# Patient Record
Sex: MALE | Race: BLACK OR AFRICAN AMERICAN | NOT HISPANIC OR LATINO | Employment: FULL TIME | URBAN - METROPOLITAN AREA
[De-identification: names, ages, dates, MRNs, and addresses within clinical notes are randomized per-mention and may not be internally consistent; named-entity substitution may affect disease eponyms.]

---

## 2024-03-20 ENCOUNTER — OFFICE VISIT (OUTPATIENT)
Dept: FAMILY MEDICINE CLINIC | Facility: CLINIC | Age: 29
End: 2024-03-20

## 2024-03-20 VITALS
DIASTOLIC BLOOD PRESSURE: 81 MMHG | BODY MASS INDEX: 21.07 KG/M2 | TEMPERATURE: 98.3 F | WEIGHT: 164.2 LBS | RESPIRATION RATE: 18 BRPM | SYSTOLIC BLOOD PRESSURE: 117 MMHG | HEIGHT: 74 IN | HEART RATE: 64 BPM | OXYGEN SATURATION: 97 %

## 2024-03-20 DIAGNOSIS — Z00.00 ANNUAL PHYSICAL EXAM: Primary | ICD-10-CM

## 2024-03-20 DIAGNOSIS — Z11.59 NEED FOR HEPATITIS C SCREENING TEST: ICD-10-CM

## 2024-03-20 DIAGNOSIS — Z11.4 ENCOUNTER FOR SCREENING FOR HIV: ICD-10-CM

## 2024-03-20 DIAGNOSIS — Z87.19 HISTORY OF DENTAL PROBLEMS: ICD-10-CM

## 2024-03-20 DIAGNOSIS — R63.4 WEIGHT LOSS, UNINTENTIONAL: ICD-10-CM

## 2024-03-20 DIAGNOSIS — R59.0 ANTERIOR CERVICAL LYMPHADENOPATHY: ICD-10-CM

## 2024-03-20 PROCEDURE — 99385 PREV VISIT NEW AGE 18-39: CPT | Performed by: FAMILY MEDICINE

## 2024-03-20 NOTE — ASSESSMENT & PLAN NOTE
Patient presents to [facility] care and for annual physical.  His only complaint today is a right-sided neck mass for which he has had for several months, which is now accompanied by 10 kg weight loss.  He states that he is otherwise healthy and does not have a significant past medical history nor does he take any regular medications.  He is a former smoker, and quit about 4 months ago.    Plan:  - Obtain head/neck ultrasound  - Obtain CBC, TSH  - Follow-up shortly after obtaining workup

## 2024-03-20 NOTE — PROGRESS NOTES
ADULT ANNUAL PHYSICAL  [facility]    NAME: James Roberts  AGE: 28 y.o. SEX: male  : 1995     DATE: 3/20/2024     Assessment and Plan:     Problem List Items Addressed This Visit       Anterior cervical lymphadenopathy     Patient complains of a right neck mass since about 2023.  He went to the emergency department back in August when he noticed that it had been slowly getting larger.  Present at that time revealed possibly reactive prominent right submandibular lymph nodes.  The mass has not grown since being seen in the ED.  He reports that it is not painful and denies overlying erythema, warmth, tenderness. He also denied any trauma, fevers, chills, night sweats, neck pain/stiffness, dental pain, personal or family history of thyroid disorder or history of lymphoma, night sweats, headache, eye complaints, ear complaints, URI symptoms, chest pain, SOB, N/V/D/abdominal pain.  He does endorse a 10 kg weight loss.    On exam, the mass is mobile, nontender, firm, about 2 to 3 cm, no overlying erythema/warmth/discoloration of the skin.    DDx: Reactive lymph node, lymphoma, less likely atypical thyroid nodule    Plan:  - Repeat head and neck ultrasound  - Obtain CBC, TSH  -Follow-up after getting blood work and ultrasound         Relevant Orders    US head neck soft tissue    CBC and differential    Annual physical exam - Primary     Patient presents to establish care and for annual physical.  His only complaint today is a right-sided neck mass for which he has had for several months, which is now accompanied by 10 kg weight loss.  He states that he is otherwise healthy and does not have a significant past medical history nor does he take any regular medications.  He is a former smoker, and quit about 4 months ago.    Plan:  - Obtain head/neck ultrasound  - Obtain CBC, TSH  - Follow-up shortly after obtaining workup          Weight loss, unintentional     Patient reports a 10 kg weight loss over the span of 4 months period.  This is unintentional, and patient states that he has been otherwise eating about the same as he normally does.  No history of thyroid disorder, PHQ is negative today.    Plan:  - Obtain TSH         Relevant Orders    CBC and differential    TSH, 3rd generation with Free T4 reflex    Encounter for screening for HIV     Patient with weight loss, reactive lymph node  Also agreeable to one-time screening for HIV    Plan:  - Obtain HIV screening         Relevant Orders    HIV 1/2 AB/AG w Reflex [facility] for 2 yr old and above    Need for hepatitis C screening test     Patient with weight loss, lymphadenopathy  Agreeable to one-time screening test for hepatitis C    Plan:  - Obtain hepatitis C screening test         Relevant Orders    Hepatitis C antibody    History of dental problems     Patient not established with a dentist.  Reports brushing twice a day and occasional coughing.  Last time he went to a dentist was in [place], which was over 2 years ago.    Plan:  - Referred to dental clinic         Relevant Orders    Ambulatory referral to [facility]         Immunizations and preventive care screenings were discussed with patient today. Appropriate education was printed on patient's after visit summary.    Counseling:  Dental Health: discussed importance of regular tooth brushing, flossing, and dental visits.  Sexual health: discussed sexually transmitted diseases, partner selection, use of condoms, avoidance of unintended pregnancy, and contraceptive alternatives.  Exercise: the importance of regular exercise/physical activity was discussed. Recommend exercise 3-5 times per week for at least 30 minutes.          Return in about 5 weeks (around 4/24/2024) for Follow up on ultrasound results and labwork.     Chief Complaint:     Chief Complaint   Patient presents with    Establish Care    Mass     On neck,  "growing bigger. No pain. Present for 6 months       History of Present Illness:     Adult Annual Physical   Patient here for a comprehensive physical exam. The patient reports problems - right-sided neck mass .    Patient is a 28-year-old male without significant past medical history who presents to the office today to establish care, for annual physical, and to discuss a right-sided neck mass.  Patient states that he did not ever need to regularly take any medications, nor is he on any at this time.  He is unaware of any significant family history, though he believes his mother and does have high blood pressure.  Patient recently moved from [place] about 2 years ago, and he lives with his wife and 2 daughters.  Patient had smoked for 12 years and recently quit about 4-6 months ago.  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    With regard to this right-sided neck mass, he was seen in August 2023 for the same.  At the ED, he underwent some blood work which was relatively unremarkable and head/neck ultrasound.  The ultrasound showed \"mildly prominent right submandibular lymph nodes, which could be reactive given maintaining normal shape and morphology, but warrants continued surveillance.\"  He was recommended at that time that he would repeat ultrasound in 4 to 6 weeks, however, due to work and other commitments he has not been able to do so.    The mass has remained about the same size, about 2 to 3 cm, since August.  Though he does admit that it had been increasing in size prior to that.  The mass is nontender, mobile, does not fluctuate in size, no overlying erythema, no overlying warmth.  He does not believe there are any other lumps/bumps on his body, though from the ED he was told that there were other lymph nodes that were larger.     He denies trouble swallowing, pain in throat or mouth, trauma, surgeries, night sweats, recent dental procedures or infections, known family history of " "cancers or thyroid problems.  He does however note that he is lost about 10 kg over the past 4 months despite a relatively normal diet.        Diet and Physical Activity  Diet/Nutrition: well balanced diet.   Exercise: no formal exercise.      Depression Screening  PHQ-2/9 Depression Screening    Little interest or pleasure in doing things: 1 - several days  Feeling down, depressed, or hopeless: 1 - several days  PHQ-2 Score: 2  PHQ-2 Interpretation: Negative depression screen       General Health  Sleep: gets 7-8 hours of sleep on average.   Hearing: normal - bilateral.  Vision: wears glasses.   Dental: no dental visits for >1 year, brushes teeth twice daily, and flosses teeth occasionally.        Health  History of STDs?: no.    28 y.o. male here for annual physical exam.     Immunization:  -Reports UTD    Screening: I have discussed each screening test below (per USPSTF guideline) with patient, to which patient expresses understanding and is agreeable to plan  - Obesity -- Negative (all children >7yo)  - Hypertension -- Negative (all adults)  - Nicotine/EtOH/Drugs --  Smoked tobacco for 12 yrs; quit 4 months ago  (all adults >19yo)  - Depression -- Negative (all adults AND adolescents 12-17YO)  Depression: Not at risk (3/20/2024)    PHQ-2     PHQ-2 Score: 2     - HIV -- Ordered (all adults age 15-65)  - Hep C -- Ordered (all adults age 18-79)  No results found for: \"HIVAGAB\", \"HEPCAB\"  - T2DM -- Deferred by patient. (adults age 35-70 who has overweight/obesity)  No results found for: \"HGBA1C\"  Lab Results   Component Value Date    CREATININE 1.08 08/02/2023         Health Maintenance   Topic Date Due    Hepatitis C Screening  Never done    HIV Screening  Never done    BMI: Adult  Never done    DTaP,Tdap,and Td Vaccines (1 - Tdap) Never done    Influenza Vaccine (1) Never done    COVID-19 Vaccine (1 - 2023-24 season) Never done    Depression Screening  03/20/2025    Annual Physical  03/20/2025    Zoster Vaccine " (1 of 2) 04/14/2044    Pneumococcal Vaccine: Pediatrics (0 to 5 Years) and At-Risk Patients (6 to 64 Years)  Aged Out    HIB Vaccine  Aged Out    IPV Vaccine  Aged Out    Hepatitis A Vaccine  Aged Out    Meningococcal ACWY Vaccine  Aged Out    HPV Vaccine  Aged Out            Review of Systems:     Review of Systems   Constitutional:  Positive for unexpected weight change (10kg weight loss in 4 months). Negative for activity change, appetite change, chills, fatigue and fever.   HENT:  Negative for congestion, dental problem, drooling, ear discharge, ear pain, facial swelling, hearing loss, mouth sores, sore throat, tinnitus, trouble swallowing and voice change.    Eyes:  Negative for pain and visual disturbance.   Respiratory:  Negative for apnea, cough, choking, shortness of breath, wheezing and stridor.    Cardiovascular:  Negative for chest pain and palpitations.   Gastrointestinal:  Negative for abdominal distention, abdominal pain, blood in stool, constipation, diarrhea, nausea and vomiting.   Endocrine: Negative for cold intolerance, heat intolerance, polydipsia, polyphagia and polyuria.   Genitourinary:  Negative for dysuria, flank pain, frequency, hematuria, penile pain, testicular pain and urgency.   Musculoskeletal:  Negative for neck pain and neck stiffness.        Right-sided neck mass   Skin:  Negative for color change and rash.   Neurological:  Negative for dizziness, syncope, facial asymmetry, speech difficulty, weakness, light-headedness and headaches.   Psychiatric/Behavioral:  Negative for dysphoric mood, sleep disturbance and suicidal ideas. The patient is not nervous/anxious.       Past Medical History:     History reviewed. No pertinent past medical history.   Past Surgical History:     History reviewed. No pertinent surgical history.   Social History:     Social History     Socioeconomic History    Marital status: Single     Spouse name: None    Number of children: None    Years of education:  None    Highest education level: None   Occupational History    None   Tobacco Use    Smoking status: Every Day     Types: Cigarettes    Smokeless tobacco: Never   Vaping Use    Vaping status: Never Used   Substance and Sexual Activity    Alcohol use: Yes     Comment: Socially    Drug use: Never    Sexual activity: Yes     Partners: Female   Other Topics Concern    None   Social History Narrative    None     Social Determinants of Health     Financial Resource Strain: Low Risk  (3/20/2024)    Overall Financial Resource Strain (CARDIA)     Difficulty of Paying Living Expenses: Not hard at all   Food Insecurity: No Food Insecurity (3/20/2024)    Hunger Vital Sign     Worried About Running Out of Food in the Last Year: Never true     Ran Out of Food in the Last Year: Never true   Transportation Needs: No Transportation Needs (3/20/2024)    PRAPARE - Transportation     Lack of Transportation (Medical): No     Lack of Transportation (Non-Medical): No   Physical Activity: Inactive (3/20/2024)    Exercise Vital Sign     Days of Exercise per Week: 0 days     Minutes of Exercise per Session: 0 min   Stress: Stress Concern Present (3/20/2024)    [employer] - Occupational Stress Questionnaire     Feeling of Stress : To some extent   Social Connections: Socially Isolated (3/20/2024)    Social Connection and Isolation Panel [NHANES]     Frequency of Communication with Friends and Family: More than three times a week     Frequency of Social Gatherings with Friends and Family: More than three times a week     Attends [employer] Services: Never     Active Member of Clubs or Organizations: No     Attends Club or Organization Meetings: Never     Marital Status: Never    Intimate Partner Violence: Not At Risk (3/20/2024)    Humiliation, Afraid, Rape, and Kick questionnaire     Fear of Current or Ex-Partner: No     Emotionally Abused: No     Physically Abused: No     Sexually Abused: No   Housing  "Stability: Unknown (3/20/2024)    Housing Stability Vital Sign     Unable to Pay for Housing in the Last Year: No     Number of Places Lived in the Last Year: Not on file     Unstable Housing in the Last Year: No      Family History:     History reviewed. No pertinent family history.   Current Medications:     No current outpatient medications on file.     No current facility-administered medications for this visit.      Allergies:     No Known Allergies   Physical Exam:     /81 (BP Location: Right arm, Patient Position: Sitting, Cuff Size: Large)   Pulse 64   Temp 98.3 °F (36.8 °C) (Temporal)   Resp 18   Ht 6' 2.1\" (1.882 m)   Wt 74.5 kg (164 lb 3.2 oz)   SpO2 97%   BMI 21.03 kg/m²     Physical Exam  Constitutional:       General: He is not in acute distress.     Appearance: Normal appearance. He is normal weight.   HENT:      Head: Normocephalic and atraumatic.      Right Ear: Tympanic membrane, ear canal and external ear normal.      Left Ear: Tympanic membrane, ear canal and external ear normal.      Nose: Nose normal.      Mouth/Throat:      Mouth: Mucous membranes are moist.      Pharynx: Oropharynx is clear.   Eyes:      Extraocular Movements: Extraocular movements intact.      Conjunctiva/sclera: Conjunctivae normal.      Pupils: Pupils are equal, round, and reactive to light.   Cardiovascular:      Rate and Rhythm: Normal rate and regular rhythm.      Heart sounds: Normal heart sounds.   Pulmonary:      Effort: Pulmonary effort is normal.      Breath sounds: Normal breath sounds.   Abdominal:      General: Abdomen is flat. Bowel sounds are normal. There is no distension.      Palpations: Abdomen is soft.      Tenderness: There is no abdominal tenderness.   Musculoskeletal:      Cervical back: Normal range of motion and neck supple. No tenderness.   Lymphadenopathy:      Cervical: Cervical adenopathy (anterior and posterior superficial cervical lymphadenopathy; 2-3cm; mobile; non-tender) " present.      Right cervical: Superficial cervical adenopathy and posterior cervical adenopathy present.   Skin:     General: Skin is warm and dry.      Capillary Refill: Capillary refill takes less than 2 seconds.   Neurological:      Mental Status: He is alert and oriented to person, place, and time. Mental status is at baseline.   Psychiatric:         Mood and Affect: Mood normal.         Behavior: Behavior normal.          DO JJ Emery [facility]

## 2024-03-20 NOTE — ASSESSMENT & PLAN NOTE
Patient reports a 10 kg weight loss over the span of 4 months period.  This is unintentional, and patient states that he has been otherwise eating about the same as he normally does.  No history of thyroid disorder, PHQ is negative today.    Plan:  - Obtain TSH

## 2024-03-20 NOTE — ASSESSMENT & PLAN NOTE
Patient complains of a right neck mass since about July 2023.  He went to the emergency department back in August when he noticed that it had been slowly getting larger.  Present at that time revealed possibly reactive prominent right submandibular lymph nodes.  The mass has not grown since being seen in the ED.  He reports that it is not painful and denies overlying erythema, warmth, tenderness. He also denied any trauma, fevers, chills, night sweats, neck pain/stiffness, dental pain, personal or family history of thyroid disorder or history of lymphoma, night sweats, headache, eye complaints, ear complaints, URI symptoms, chest pain, SOB, N/V/D/abdominal pain.  He does endorse a 10 kg weight loss.    On exam, the mass is mobile, nontender, firm, about 2 to 3 cm, no overlying erythema/warmth/discoloration of the skin.    DDx: Reactive lymph node, lymphoma, less likely atypical thyroid nodule    Plan:  - Repeat head and neck ultrasound  - Obtain CBC, TSH  -Follow-up after getting blood work and ultrasound

## 2024-03-20 NOTE — ASSESSMENT & PLAN NOTE
Patient not established with a dentist.  Reports brushing twice a day and occasional coughing.  Last time he went to a dentist was in [place], which was over 2 years ago.    Plan:  - Referred to dental clinic

## 2024-03-20 NOTE — ASSESSMENT & PLAN NOTE
Patient with weight loss, reactive lymph node  Also agreeable to one-time screening for HIV    Plan:  - Obtain HIV screening

## 2024-03-20 NOTE — ASSESSMENT & PLAN NOTE
Patient with weight loss, lymphadenopathy  Agreeable to one-time screening test for hepatitis C    Plan:  - Obtain hepatitis C screening test